# Patient Record
Sex: FEMALE | Race: WHITE | NOT HISPANIC OR LATINO | Employment: OTHER | URBAN - METROPOLITAN AREA
[De-identification: names, ages, dates, MRNs, and addresses within clinical notes are randomized per-mention and may not be internally consistent; named-entity substitution may affect disease eponyms.]

---

## 2019-01-31 ENCOUNTER — PATIENT MESSAGE (OUTPATIENT)
Dept: NEUROLOGY | Facility: CLINIC | Age: 69
End: 2019-01-31

## 2019-01-31 ENCOUNTER — OFFICE VISIT (OUTPATIENT)
Dept: NEUROLOGY | Facility: CLINIC | Age: 69
End: 2019-01-31
Payer: MEDICARE

## 2019-01-31 VITALS
BODY MASS INDEX: 23.8 KG/M2 | HEART RATE: 104 BPM | HEIGHT: 68 IN | SYSTOLIC BLOOD PRESSURE: 129 MMHG | DIASTOLIC BLOOD PRESSURE: 78 MMHG

## 2019-01-31 DIAGNOSIS — G20.C PARKINSONIAN SYNDROME ASSOCIATED WITH IDIOPATHIC ORTHOSTATIC HYPOTENSION: ICD-10-CM

## 2019-01-31 DIAGNOSIS — G20.C PARKINSONISM, UNSPECIFIED PARKINSONISM TYPE: Primary | ICD-10-CM

## 2019-01-31 DIAGNOSIS — G24.9 DYSTONIA: ICD-10-CM

## 2019-01-31 DIAGNOSIS — R44.3 HALLUCINATIONS: ICD-10-CM

## 2019-01-31 DIAGNOSIS — I95.1 PARKINSONIAN SYNDROME ASSOCIATED WITH IDIOPATHIC ORTHOSTATIC HYPOTENSION: ICD-10-CM

## 2019-01-31 DIAGNOSIS — R29.6 MULTIPLE FALLS: ICD-10-CM

## 2019-01-31 DIAGNOSIS — G24.3 CERVICAL DYSTONIA: ICD-10-CM

## 2019-01-31 PROCEDURE — 99999 PR PBB SHADOW E&M-EST. PATIENT-LVL IV: ICD-10-PCS | Mod: PBBFAC,,, | Performed by: NURSE PRACTITIONER

## 2019-01-31 PROCEDURE — 99205 PR OFFICE/OUTPT VISIT, NEW, LEVL V, 60-74 MIN: ICD-10-PCS | Mod: S$PBB,,, | Performed by: NURSE PRACTITIONER

## 2019-01-31 PROCEDURE — 99214 OFFICE O/P EST MOD 30 MIN: CPT | Mod: PBBFAC | Performed by: NURSE PRACTITIONER

## 2019-01-31 PROCEDURE — 99999 PR PBB SHADOW E&M-EST. PATIENT-LVL IV: CPT | Mod: PBBFAC,,, | Performed by: NURSE PRACTITIONER

## 2019-01-31 PROCEDURE — 99205 OFFICE O/P NEW HI 60 MIN: CPT | Mod: S$PBB,,, | Performed by: NURSE PRACTITIONER

## 2019-01-31 RX ORDER — QUETIAPINE FUMARATE 25 MG/1
50 TABLET, FILM COATED ORAL
COMMUNITY
Start: 2015-08-24

## 2019-01-31 RX ORDER — FLUDROCORTISONE ACETATE 0.1 MG/1
100 TABLET ORAL DAILY
COMMUNITY

## 2019-01-31 RX ORDER — PANTOPRAZOLE SODIUM 40 MG/1
TABLET, DELAYED RELEASE ORAL
COMMUNITY
Start: 2019-01-09

## 2019-01-31 RX ORDER — MIDODRINE HYDROCHLORIDE 5 MG/1
TABLET ORAL
COMMUNITY
Start: 2019-01-23

## 2019-01-31 RX ORDER — LACTULOSE 10 G/15ML
SOLUTION ORAL; RECTAL
COMMUNITY
Start: 2018-11-30

## 2019-01-31 RX ORDER — DULOXETIN HYDROCHLORIDE 60 MG/1
CAPSULE, DELAYED RELEASE ORAL
COMMUNITY
Start: 2019-01-23 | End: 2020-01-02

## 2019-01-31 RX ORDER — CARBIDOPA AND LEVODOPA 25; 100 MG/1; MG/1
0.5 TABLET ORAL 2 TIMES DAILY
COMMUNITY

## 2019-01-31 RX ORDER — MULTIVITAMIN
1 TABLET ORAL
COMMUNITY

## 2019-01-31 RX ORDER — DOCUSATE SODIUM 100 MG/1
100 CAPSULE, LIQUID FILLED ORAL
COMMUNITY

## 2019-01-31 NOTE — PATIENT INSTRUCTIONS
Nuplazid can be used for hallucinations. You would need to stop Seroquel for 2 weeks before we could start this.     I would like to know what time of day you take carbidopa / levodopa.     Botox may help your neck.

## 2019-01-31 NOTE — PROGRESS NOTES
"Name: Jolanta Swan  MRN: [id number]   CSN: [id number]      Date: 1-31-19      Referring physician:  Aaarefgeorge Self  No address on file    Subjective:      Chief Complaint: eval for DLB    History of Present Illness (HPI):    Jolanta Swan is a 68 y.o. right-handed female who presents from [place] for an initial evaluation of DLB and is accompanied by son and daughter-in-law.     She has been under the care of Dr. Barrientos, neurologist, in [place] for DLB, diagnosed about 4 years ago. They decided to come to [facility] after they got tired of not getting any options for her treatment. A patient that comes to this clinic recommend she come here.       She has had intermittent falls since her senior care in 2010. Falls have happened with and without dizziness.     Five years ago, she a "massive hallucination event, freaking out, seeing things, could not function and had to go to ED."  She hallucinated a few more times within that year. They think at least a couple of times she may have had a UTI but was not the case with all the hallucinations.   Also in this time frame, she started having problems with orthostatic hypotension.   She started Seroquel 4-5 years ago due to the hallucinations and remains on this. She is interested in Nuplazid.      Four year ago, she was walking with a friend who is a PT. She noted something in her walk and told her she had PD. She then went to see Dr. Barrientos and was diagnosed with DLB.   She recalls having Spring scan and told it was abnormal.     She is taking cd/ld but is not sure if it helps but does not know what it is supposed to do. Son agrees. She has been on this for four years. She thinks it may have helped a little in the very beginning but cannot be sure. She is currently living in [facility]. They administer her meds. She is currently taking cd/ld 25/100 1/2 tab BID (thinks she takes around 8AM and 7PM). It is not clear if she has taken more " "cd/ld in the past.     Three years ago, she passed out due to orthostasis, fell and broke her left hip. While she continues to have OHN, it is less frequent now. Per med list, she is on both fludrocortisone and midodrine. A couple of years ago, she was prescribed Northera but it was going to cost $12K /month. She has not had any recent syncope. She is more likely to feel lightheaded after she eats or uses restroom.        She continues to hallucinate frequently but not daily. She describes as "little tiny ones, little pumpkin faces and people twisting on cables." When she watches TV, some of the actors have pumpkin heads.She thinks could be casued by glasses not fitting properly. She sees dogs on occasion. The hallucinations are very disturbing to her.      She feels her memory is okay but "sometimes better than others." Son notes that she has difficulty finding the right words and her long term is better than short term. If she gets hyperfocused on something, she will not forget it.     She has had a number of falls but not recently as she is in . She has to have assist to walk.     Past year, balance has worsened. Will fall right away if don't walk with her. Getting worse the past 6 months.     For the past 6-9 months, she has had trouble standing up straight and her head tilts to the side, which causes neck pain and stiffness. She has never had Botox or other meds for this.       Recently, she has started to notice a very brief tremor BH. She thinks this is self induced as it is so quick.  Writing has deteoritated.     Aware of mucles stiffness- fairly generalized. Goes to PT 4 times per week, helps to a point.   Feels has to be slow so as not to fall. Aware of cognitive slowness.       Review of Systems   Constitutional: Negative for fatigue.   HENT: Negative for drooling and trouble swallowing.    Eyes: Positive for discharge.   Respiratory: Negative for cough and choking.    Cardiovascular: Negative for leg " "swelling.   Gastrointestinal: Positive for constipation (always been ).   Genitourinary: Positive for urgency.        "not the best control"   Musculoskeletal: Positive for gait problem, neck pain and neck stiffness.   Neurological: Positive for tremors. Negative for dizziness, seizures, syncope and light-headedness.   Psychiatric/Behavioral: Positive for dysphoric mood (off and on all life), hallucinations and sleep disturbance (RBD- alwasy done this- even as child). Negative for agitation and behavioral problems. The patient is nervous/anxious (off and on all life).        Past Medical History: The patient  has a past medical history of Abnormal cervical Pap smear with positive HPV DNA test, Colon polyps, Constipation, Depression with anxiety, Diverticulosis, GERD (gastroesophageal reflux disease), Hyperlipidemia, MALT (mucosa associated lymphoid tissue), Osteoporosis, unspecified, and Rosacea.    Social History: The patient  reports that  has never smoked. she has never used smokeless tobacco. She reports that she drinks about 4.2 - 8.4 oz of alcohol per week. She reports that she does not use drugs.    Family History: Their family history includes Dementia in her father; Heart disease in her father; Hypertension in her father; Lung cancer in her maternal aunt; Parkinsonism in her mother; Stroke in her father.    Allergies: Patient has no known allergies.     Meds:   Current Outpatient Medications on File Prior to Visit   Medication Sig Dispense Refill    carbidopa-levodopa  mg (SINEMET)  mg per tablet Take 0.5 tablets by mouth 2 (two) times daily.      docusate sodium (COLACE) 100 MG capsule Take 100 mg by mouth.      DULoxetine (CYMBALTA) 60 MG capsule       fludrocortisone (FLORINEF) 0.1 mg Tab Take 100 mcg by mouth once daily.      lactulose (CHRONULAC) 10 gram/15 mL solution       levomefolate calcium (DEPLIN) 15 mg Tab Take 15 mg by mouth once daily.      mirtazapine (REMERON) 15 MG " "tablet   0    pantoprazole (PROTONIX) 40 MG tablet       QUEtiapine (SEROQUEL) 25 MG Tab Take 50 mg by mouth.      vitamin D 1000 units Tab Take 185 mg by mouth once daily.      alprazolam (XANAX XR) 1 MG Tb24   0    alprazolam (XANAX) 0.5 MG tablet Take by mouth. 1 Tablet Oral Three times a day.  1 every morning2 every evening      atorvastatin (LIPITOR) 20 MG tablet TAKE ONE TABLET BY MOUTH AT BEDTIME 30 tablet 0    folic acid (FOLVITE) 1 MG tablet   0    midodrine (PROAMATINE) 5 MG Tab       multivitamin (THERAGRAN) per tablet Take 1 tablet by mouth.      [DISCONTINUED] venlafaxine (EFFEXOR-XR) 150 MG Cp24        No current facility-administered medications on file prior to visit.        Objective:     Physical Exam:    Vitals:    01/31/19 1511   BP: 129/78   Pulse: 104   Height: 5' 8" (1.727 m)     Body mass index is 23.8 kg/m².    Constitutional  Well-developed, well-nourished, appears stated age   Cardiovascular  Radial pulses 2+ and symmetric, no LE edema bilaterally     ..  Neurological    * Mental status  MOCA = deferred today   - Orientation Oriented to: person, place, situation, day of week and year  Not oriented to: month of year and floor  When asked president, states she will not utter his name in mixed company. When asked to spell his last name, she is not able to do.      - Memory     Not Tested- provides good hx, relies on son to a degree     - Attention/concentration  Normal     - Language  Somewhat monotone but no real hypophonia or dysarthria     .  * Cranial nerves       - CN II  PERRL, visual fields full to confrontation     - CN III, IV, VI  Extraocular movements full, slowed pursuits and saccades     - CN V  Sensation V1 - V3 intact     - CN VII  Face strong and symmetric bilaterally     - CN VIII  Hearing intact bilaterally     - CN IX, X  Palate raises midline and symmetric     - CN XI  SCM and trapezius 5/5 bilaterally     - CN XII  Tongue midline   * Motor  Muscle essentially " with full power- some trouble with direction when testing muscles on the left side at times, jonas the LLE.   * Coordination  No dysmetria with finger-to-nose but struggles with direction with left finger- will keep putting the LH down and use the RH.    * Gait  See below.         * Specialized movement exam  No hypophonic speech, but somewhat monotone.    Mild facial masking.     Near moderate cogwheel rigidity BUE (L>R) and RLE. Moderate cogwheel LLE.       Mild edy B FT (L>R).  Mild edy R HG and near mod L HG.   Near mod malik R PS.   Mild edy L PS.  Moderate edy B TT (R>L).     Subtle rest tremor BH (low amplitude, mod freq- bit jerky quality).   Subtle tremor present with posture and intention. Has some diff with direction when attempting Left finger to nose.      Head almost to L shoulder with R tilt. Anterocollis.    Elevated L hip (since hip fx 4 years ago).  Elevated Right shoulder.   Truncal dystonia.   Hypertrophied R cervcal paraspinal and trapezius muscles.     No chorea, athetosis, myoclonus, or tics.     Arises out of chair with assist of son.   Step short and narrow based.   Truncal dystonia with elevated L hip and R shoulder.   Near absent arm swing B.   Freezing noted with turns.   Would fall without assist of another.              Laboratory Results:  No visits with results within 3 Month(s) from this visit.   Latest known visit with results is:   Lab Visit on 10/01/2014   Component Date Value Ref Range Status    Sodium 10/01/2014 139  136 - 145 mmol/L Final    Potassium 10/01/2014 4.1  3.5 - 5.1 mmol/L Final    Chloride 10/01/2014 104  95 - 110 mmol/L Final    CO2 10/01/2014 26  23 - 29 mmol/L Final    Glucose 10/01/2014 95  70 - 110 mg/dL Final    BUN, Bld 10/01/2014 10  8 - 23 mg/dL Final    Creatinine 10/01/2014 0.7  0.5 - 1.4 mg/dL Final    Calcium 10/01/2014 9.1  8.7 - 10.5 mg/dL Final    Total Protein 10/01/2014 6.7  6.0 - 8.4 g/dL Final    Albumin 10/01/2014 3.9  3.5 - 5.2  g/dL Final    Total Bilirubin 10/01/2014 0.5  0.1 - 1.0 mg/dL Final    Alkaline Phosphatase 10/01/2014 70  55 - 135 U/L Final    AST 10/01/2014 14  10 - 40 U/L Final    ALT 10/01/2014 15  10 - 44 U/L Final    Anion Gap 10/01/2014 9  8 - 16 mmol/L Final    eGFR if African American 10/01/2014 >60.0  >60 mL/min/1.73 m^2 Final    eGFR if non African American 10/01/2014 >60.0  >60 mL/min/1.73 m^2 Final    WBC 10/01/2014 9.09  3.90 - 12.70 K/uL Final    RBC 10/01/2014 3.93* 4.00 - 5.40 M/uL Final    Hemoglobin 10/01/2014 12.1  12.0 - 16.0 g/dL Final    Hematocrit 10/01/2014 35.5* 37.0 - 48.5 % Final    MCV 10/01/2014 90  82 - 98 fL Final    MCH 10/01/2014 30.8  27.0 - 31.0 pg Final    MCHC 10/01/2014 34.1  32.0 - 36.0 % Final    RDW 10/01/2014 13.1  11.5 - 14.5 % Final    Platelets 10/01/2014 266  150 - 350 K/uL Final    MPV 10/01/2014 9.4  9.2 - 12.9 fL Final    Gran # (ANC) 10/01/2014 7.0  1.8 - 7.7 K/uL Final    Lymph # 10/01/2014 1.5  1.0 - 4.8 K/uL Final    Mono # 10/01/2014 0.4  0.3 - 1.0 K/uL Final    Eos # 10/01/2014 0.1  0.0 - 0.5 K/uL Final    Baso # 10/01/2014 0.03  0.00 - 0.20 K/uL Final    Gran% 10/01/2014 77.3* 38.0 - 73.0 % Final    Lymph% 10/01/2014 16.5* 18.0 - 48.0 % Final    Mono% 10/01/2014 4.8  4.0 - 15.0 % Final    Eosinophil% 10/01/2014 0.9  0.0 - 8.0 % Final    Basophil% 10/01/2014 0.3  0.0 - 1.9 % Final    Differential Method 10/01/2014 Automated   Final       Imaging:   Independent review of images: No outside images or other records brought with them today.     Assessment and Plan     Parkinsonism, unspecified Parkinsonism type  Comments:  Diagnosed 4 years ago as DLB but question if this is correct dx at this point.    Hallucinations  Comments:  Began 5 years ago, continue today and are quite bothersome. Some have been in the setting of UTI but this is not the case wtih all of the hallucinations.     Cervical dystonia  Comments:  Began 6-9 months  ago.    Dystonia  Comments:  Truncal-> began 6-9 months ago    Parkinsonian syndrome associated with idiopathic orthostatic hypotension  Comments:  Orthostatic hypotension began 5 years ago.     Multiple falls  Comments:  Began around 2010, some associated wtih orthostatic hypotension but not all.         Medical Decision Making:    Ms. Swan is certainly parkinsonian and has what sounds like a classic onset of DLB when she developed hallucinations and orthostatic hypotension 5 years ago, followed by a parkinsonian gait 4 years ago. However, in the past 6-9 months, she has developed prominent cervical and truncal dystonia. In addition, her rigidity is much more significant than I have seen in other DLB cases. Rigidity is bilateral but generally more prominent on the left. She also seems to have some Left side neglect when testing left finger to nose and some of the power testing on left side, jonas the LLE, which makes me question CBD, except the rigidity is definitely bilateral.      L-dopa response unclear but sounds like it was never a robust response. At present, she is only taking 25/100 1/2 tab BID (presumably at 8AM and 7PM).  I have asked them to verify the times and message me.     She reportedly has had an abnormal Spring. Not clear if she has had MRI brain or other work-up.   Her mother had some type of parkinsonism.     For now, she will continue her meds without change.   They will let me know about cd/ld dose times.     Answered questions about Nuplazid.     Discussed Botox as option for her neck. She will think about this.     I would like for her to have neuropsychological evaluation as she has never had this. Curious to see what this testing reveals.     I have also asked her to return to see Dr. Dustin Armstrong. They agree.         I spent 70 minutes face-to-face with the patient and family with >50% of the time spent with counseling and education regarding:  - results of data, diagnosis, and  recommendations stated above  - the prognosis of parkinsonism, DLB, hallucinations, dystonia  - risks and benefits of cd/ld, Botox, Nuplazid  - rationale for NP testing.  - importance of diet and exercise    Gale Covarrubias DNP, NP-C  Division of Movement and Memory Disorders  [facility]  [phone]

## 2019-04-15 ENCOUNTER — TELEPHONE (OUTPATIENT)
Dept: NEUROLOGY | Facility: CLINIC | Age: 69
End: 2019-04-15

## 2019-04-15 NOTE — TELEPHONE ENCOUNTER
----- Message from Lukasz Angel sent at 4/15/2019  2:03 PM CDT -----  Needs Advice    Reason for call: James is calling to reschedule missed appt on 03/01/19        Communication Preference: James (son) @ [phone]    Additional Information:

## 2019-04-17 ENCOUNTER — TELEPHONE (OUTPATIENT)
Dept: NEUROLOGY | Facility: CLINIC | Age: 69
End: 2019-04-17

## 2019-04-17 NOTE — TELEPHONE ENCOUNTER
----- Message from Lukasz Angel sent at 4/17/2019  4:47 PM CDT -----  Patient Returning Call from [facility]    Who Left Message for Patient: Denisse  Communication Preference: James (son) @ [phone]  Additional Information:

## 2019-04-23 ENCOUNTER — TELEPHONE (OUTPATIENT)
Dept: NEUROLOGY | Facility: CLINIC | Age: 69
End: 2019-04-23

## 2019-04-23 NOTE — TELEPHONE ENCOUNTER
----- Message from Kati Castañeda sent at 4/23/2019  3:39 PM CDT -----  Patient Returning Call from [facility]    Who Left Message for Patient:Dneisse   Communication Preference:Vitor () @ [phone]  Additional Information:says he has made several attempts to reach someone. Please call.

## 2019-06-11 ENCOUNTER — OFFICE VISIT (OUTPATIENT)
Dept: NEUROLOGY | Facility: CLINIC | Age: 69
End: 2019-06-11
Payer: MEDICARE

## 2019-06-11 VITALS
BODY MASS INDEX: 19.58 KG/M2 | HEIGHT: 68 IN | SYSTOLIC BLOOD PRESSURE: 140 MMHG | WEIGHT: 129.19 LBS | HEART RATE: 100 BPM | DIASTOLIC BLOOD PRESSURE: 88 MMHG

## 2019-06-11 DIAGNOSIS — G20.A1 PARKINSON DISEASE: Primary | ICD-10-CM

## 2019-06-11 PROCEDURE — 99999 PR PBB SHADOW E&M-EST. PATIENT-LVL III: CPT | Mod: PBBFAC,,, | Performed by: PSYCHIATRY & NEUROLOGY

## 2019-06-11 PROCEDURE — 99213 OFFICE O/P EST LOW 20 MIN: CPT | Mod: PBBFAC | Performed by: PSYCHIATRY & NEUROLOGY

## 2019-06-11 PROCEDURE — 99999 PR PBB SHADOW E&M-EST. PATIENT-LVL III: ICD-10-PCS | Mod: PBBFAC,,, | Performed by: PSYCHIATRY & NEUROLOGY

## 2019-06-11 RX ORDER — DONEPEZIL HYDROCHLORIDE 10 MG/1
10 TABLET, FILM COATED ORAL NIGHTLY
Qty: 30 TABLET | Refills: 11 | Status: SHIPPED | OUTPATIENT
Start: 2019-06-11

## 2019-06-11 NOTE — PROGRESS NOTES
Name: Jolanta Swan  MRN: [id number]   CSN: [id number]      Date: 06/11/2019    Patient seen and examined.  I agree with the history, exam, assessment and plan within the resident's note.            Dustin Armstrong MD, MPH  Division of Movement and Memory Disorders  [facility]

## 2019-06-13 ENCOUNTER — TELEPHONE (OUTPATIENT)
Dept: NEUROLOGY | Facility: CLINIC | Age: 69
End: 2019-06-13

## 2019-06-13 NOTE — TELEPHONE ENCOUNTER
----- Message from Lacey Pedroza sent at 6/13/2019 12:45 PM CDT -----  Contact: PT son   Pt son calling in to schedule PT 6 week follow up .    Vitor Sosa can be reached at  [phone] thanks

## 2019-07-20 ENCOUNTER — PATIENT MESSAGE (OUTPATIENT)
Dept: NEUROLOGY | Facility: CLINIC | Age: 69
End: 2019-07-20

## 2019-07-22 ENCOUNTER — TELEPHONE (OUTPATIENT)
Dept: NEUROLOGY | Facility: CLINIC | Age: 69
End: 2019-07-22

## 2019-07-22 ENCOUNTER — PATIENT MESSAGE (OUTPATIENT)
Dept: NEUROLOGY | Facility: CLINIC | Age: 69
End: 2019-07-22

## 2019-07-22 NOTE — TELEPHONE ENCOUNTER
----- Message from Raymundo Elizabeth sent at 7/19/2019  5:25 PM CDT -----  Contact: Pt's Son Sosa  Pt would like to be called back regarding needing to be seen within five weeks after visit.    Pt can be reached at [phone].    Thank You.

## 2019-07-24 ENCOUNTER — TELEPHONE (OUTPATIENT)
Dept: NEUROLOGY | Facility: CLINIC | Age: 69
End: 2019-07-24

## 2019-07-24 NOTE — TELEPHONE ENCOUNTER
----- Message from Carolyn Lomax sent at 7/24/2019  7:48 AM CDT -----  Contact: [employer] request  Message     Appointment Request From: Jolanta Swan    With Provider: Dustin Armstrong MD [[facility] - Neurology]    Preferred Date Range: 7/23/2019 - 7/24/2019    Preferred Times: Any time    Reason for visit: Existing Patient    Comments:  The last visit was 6/11. We were requested to come back in 5 weeks. I thought there was an appointment scheduled automatically.

## 2019-07-26 ENCOUNTER — TELEPHONE (OUTPATIENT)
Dept: NEUROLOGY | Facility: CLINIC | Age: 69
End: 2019-07-26

## 2019-07-26 NOTE — TELEPHONE ENCOUNTER
----- Message from Yumiko Fierro sent at 7/26/2019  4:39 PM CDT -----  Contact: Vitor (son) @ [phone]  Pts son says he is returning Denisse's call.

## 2019-07-31 DIAGNOSIS — G24.3 CERVICAL DYSTONIA: Primary | ICD-10-CM

## 2019-07-31 NOTE — TELEPHONE ENCOUNTER
Call placed to patient son Vitor to schedule Botox appointment. Left message for return call to office.

## 2019-08-01 ENCOUNTER — TELEPHONE (OUTPATIENT)
Dept: NEUROLOGY | Facility: CLINIC | Age: 69
End: 2019-08-01

## 2019-08-01 NOTE — TELEPHONE ENCOUNTER
----- Message from Kati Castañeda sent at 8/1/2019  9:40 AM CDT -----  Contact: Vitor (son) @ [phone]  Patient Returning Call from [facility]    Who Left Message for Patient:Denisse  Communication Preference:Vitor (son) @ [phone]  Additional Information:

## 2019-08-01 NOTE — TELEPHONE ENCOUNTER
----- Message from Kati Castañeda sent at 8/1/2019  2:30 PM CDT -----  Contact: Vitor (son) @ [phone]  Calling to speak with someone in Dr. Armstrong's office regarding the patient's condition. Please call.

## 2019-08-03 ENCOUNTER — PATIENT MESSAGE (OUTPATIENT)
Dept: NEUROLOGY | Facility: CLINIC | Age: 69
End: 2019-08-03

## 2019-08-09 ENCOUNTER — TELEPHONE (OUTPATIENT)
Dept: NEUROLOGY | Facility: CLINIC | Age: 69
End: 2019-08-09

## 2019-08-09 NOTE — TELEPHONE ENCOUNTER
----- Message from Yumiko Fierro sent at 8/9/2019  2:52 PM CDT -----  Contact: Vitor (son) @ [phone]  Pts son says he is still waiting on a returning call concerning a f/u appt and Botox appt.  Pt was advised to return 5 weeks from 6-11-19 but she has not received an appt yet.  Pls call.

## 2019-08-10 ENCOUNTER — PATIENT MESSAGE (OUTPATIENT)
Dept: NEUROLOGY | Facility: CLINIC | Age: 69
End: 2019-08-10

## 2019-08-15 ENCOUNTER — PATIENT MESSAGE (OUTPATIENT)
Dept: NEUROLOGY | Facility: CLINIC | Age: 69
End: 2019-08-15

## 2019-08-16 ENCOUNTER — PATIENT MESSAGE (OUTPATIENT)
Dept: NEUROLOGY | Facility: CLINIC | Age: 69
End: 2019-08-16

## 2019-08-22 ENCOUNTER — PROCEDURE VISIT (OUTPATIENT)
Dept: NEUROLOGY | Facility: CLINIC | Age: 69
End: 2019-08-22
Payer: MEDICARE

## 2019-08-22 VITALS
DIASTOLIC BLOOD PRESSURE: 81 MMHG | HEART RATE: 99 BPM | BODY MASS INDEX: 19.34 KG/M2 | HEIGHT: 68 IN | SYSTOLIC BLOOD PRESSURE: 129 MMHG | WEIGHT: 127.63 LBS

## 2019-08-22 DIAGNOSIS — G20.A1 PARKINSON DISEASE: Primary | ICD-10-CM

## 2019-08-22 PROCEDURE — 64616 CHEMODENERV MUSC NECK DYSTON: CPT | Mod: PBBFAC

## 2019-08-23 RX ADMIN — ONABOTULINUMTOXINA 300 UNITS: 100 INJECTION, POWDER, LYOPHILIZED, FOR SOLUTION INTRADERMAL; INTRAMUSCULAR at 11:08

## 2019-08-28 ENCOUNTER — PATIENT MESSAGE (OUTPATIENT)
Dept: NEUROLOGY | Facility: CLINIC | Age: 69
End: 2019-08-28

## 2019-09-02 ENCOUNTER — PATIENT MESSAGE (OUTPATIENT)
Dept: NEUROLOGY | Facility: CLINIC | Age: 69
End: 2019-09-02

## 2019-09-04 NOTE — PROCEDURES
Procedures - ful note to be scanned in.  dj        Dustin Armstrong MD, MPH  Division of Movement and Memory Disorders  [facility]

## 2019-09-09 ENCOUNTER — TELEPHONE (OUTPATIENT)
Dept: NEUROLOGY | Facility: CLINIC | Age: 69
End: 2019-09-09

## 2019-09-09 NOTE — TELEPHONE ENCOUNTER
----- Message from Yumiko Fierro sent at 9/6/2019  1:11 PM CDT -----  Contact: [facility]    [phone]  Pt is having problems with her prescription for carbidopa-levodopa  mg (SINEMET)  mg per tablet it was adjusted from 2X a day to 3X a day and she has been having more tremors since the adjustment.  Pls call to discuss asap.

## 2019-09-11 NOTE — TELEPHONE ENCOUNTER
ret'd nurse's call. Patient is having increased tremors now with taking C/L 1 tab PO TID. She was taking 1/2 tab PO BID prior to this.     Would it be okay to decrease her dose some?    Requests call back with any new orders:  [phone] [place].

## 2019-09-23 ENCOUNTER — TELEPHONE (OUTPATIENT)
Dept: NEUROLOGY | Facility: CLINIC | Age: 69
End: 2019-09-23

## 2019-09-23 NOTE — TELEPHONE ENCOUNTER
Called and spoke with nurse relaying provider message to stop taking donepezil     Dustin Armstrong MD   to Jolanta Swan            8:30 AM   Stop Aricept now as a trial, and see how she does

## 2019-09-23 NOTE — TELEPHONE ENCOUNTER
----- Message from Terry Santos sent at 9/21/2019  1:40 PM CDT -----  Contact: Nurse [facility]  Teresea is calling to speak to a nurse regarding a medication her son's instructed the patient to stop taking     [phone]

## 2019-09-25 ENCOUNTER — TELEPHONE (OUTPATIENT)
Dept: NEUROLOGY | Facility: CLINIC | Age: 69
End: 2019-09-25

## 2019-09-25 NOTE — TELEPHONE ENCOUNTER
----- Message from Fern Fuller sent at 9/25/2019  1:32 PM CDT -----  Contact: [facility]  MD Dustin Sotomayor MD  Outpatient Medication Detail      Disp Refills Start End   donepezil (ARICEPT) 10 MG tablet 30 tablet 11 6/11/2019    Sig - Route: Take 1 tablet (10 mg total) by mouth every evening. Start with 1/2 tab for the first month. - Oral   Class: Print   Pharmacy     [id number] [place] - [address] [facility]    Please contact [facility] about this rx- [phone]- Mahnaz

## 2019-11-11 DIAGNOSIS — G24.3 CERVICAL DYSTONIA: Primary | ICD-10-CM

## 2019-11-13 ENCOUNTER — PROCEDURE VISIT (OUTPATIENT)
Dept: NEUROLOGY | Facility: CLINIC | Age: 69
End: 2019-11-13
Payer: MEDICARE

## 2019-11-13 VITALS — SYSTOLIC BLOOD PRESSURE: 138 MMHG | DIASTOLIC BLOOD PRESSURE: 83 MMHG | HEART RATE: 101 BPM

## 2019-11-13 DIAGNOSIS — G20.C PARKINSONISM, UNSPECIFIED PARKINSONISM TYPE: Primary | ICD-10-CM

## 2019-11-15 ENCOUNTER — TELEPHONE (OUTPATIENT)
Dept: NEUROLOGY | Facility: CLINIC | Age: 69
End: 2019-11-15

## 2019-11-15 NOTE — TELEPHONE ENCOUNTER
----- Message from Neelima Retana sent at 11/15/2019  1:29 PM CST -----  Contact: pt  Pt was seen yesterday and [facility] wants to know of any special orders they need to know about      [facility] [phone]

## 2019-12-09 ENCOUNTER — PATIENT MESSAGE (OUTPATIENT)
Dept: NEUROLOGY | Facility: CLINIC | Age: 69
End: 2019-12-09

## 2019-12-18 ENCOUNTER — OFFICE VISIT (OUTPATIENT)
Dept: NEUROLOGY | Facility: CLINIC | Age: 69
End: 2019-12-18
Payer: MEDICARE

## 2019-12-18 DIAGNOSIS — G24.3 CERVICAL DYSTONIA: Primary | ICD-10-CM

## 2019-12-18 DIAGNOSIS — G20.C PARKINSONISM, UNSPECIFIED PARKINSONISM TYPE: ICD-10-CM

## 2019-12-18 PROCEDURE — 99499 UNLISTED E&M SERVICE: CPT | Mod: S$PBB,95,, | Performed by: PSYCHIATRY & NEUROLOGY

## 2019-12-18 PROCEDURE — 99999 PR PBB SHADOW E&M-EST. PATIENT-LVL I: CPT | Mod: PBBFAC,95,, | Performed by: PSYCHIATRY & NEUROLOGY

## 2019-12-18 PROCEDURE — 99499 NO LOS: ICD-10-PCS | Mod: S$PBB,95,, | Performed by: PSYCHIATRY & NEUROLOGY

## 2019-12-18 PROCEDURE — 99211 OFF/OP EST MAY X REQ PHY/QHP: CPT | Mod: PBBFAC | Performed by: PSYCHIATRY & NEUROLOGY

## 2019-12-18 PROCEDURE — 99999 PR PBB SHADOW E&M-EST. PATIENT-LVL I: ICD-10-PCS | Mod: PBBFAC,95,, | Performed by: PSYCHIATRY & NEUROLOGY

## 2019-12-18 NOTE — PROGRESS NOTES
Attempted to connect for VIrtual Visit.  Some technical difficulty and the patient disconnected.  Tried to call all three numbers in the chart and there was no answer.  Will try to reschedule asap.  nevin

## 2019-12-23 ENCOUNTER — PATIENT MESSAGE (OUTPATIENT)
Dept: NEUROLOGY | Facility: CLINIC | Age: 69
End: 2019-12-23

## 2020-01-02 ENCOUNTER — OFFICE VISIT (OUTPATIENT)
Dept: NEUROLOGY | Facility: CLINIC | Age: 70
End: 2020-01-02
Payer: MEDICARE

## 2020-01-02 DIAGNOSIS — G24.3 CERVICAL DYSTONIA: Primary | ICD-10-CM

## 2020-01-02 PROCEDURE — 1159F PR MEDICATION LIST DOCUMENTED IN MEDICAL RECORD: ICD-10-PCS | Mod: 95,,, | Performed by: PSYCHIATRY & NEUROLOGY

## 2020-01-02 PROCEDURE — 99213 OFFICE O/P EST LOW 20 MIN: CPT | Mod: S$PBB,95,, | Performed by: PSYCHIATRY & NEUROLOGY

## 2020-01-02 PROCEDURE — 99999 PR PBB SHADOW E&M-EST. PATIENT-LVL I: CPT | Mod: PBBFAC,95,, | Performed by: PSYCHIATRY & NEUROLOGY

## 2020-01-02 PROCEDURE — 99213 PR OFFICE/OUTPT VISIT, EST, LEVL III, 20-29 MIN: ICD-10-PCS | Mod: S$PBB,95,, | Performed by: PSYCHIATRY & NEUROLOGY

## 2020-01-02 PROCEDURE — 99999 PR PBB SHADOW E&M-EST. PATIENT-LVL I: ICD-10-PCS | Mod: PBBFAC,95,, | Performed by: PSYCHIATRY & NEUROLOGY

## 2020-01-02 PROCEDURE — 1159F MED LIST DOCD IN RCRD: CPT | Mod: 95,,, | Performed by: PSYCHIATRY & NEUROLOGY

## 2020-01-02 PROCEDURE — 99211 OFF/OP EST MAY X REQ PHY/QHP: CPT | Mod: PBBFAC | Performed by: PSYCHIATRY & NEUROLOGY

## 2020-01-02 RX ORDER — SERTRALINE HYDROCHLORIDE 25 MG/1
25 TABLET, FILM COATED ORAL DAILY
Qty: 30 TABLET | Refills: 11 | Status: SHIPPED | OUTPATIENT
Start: 2020-01-02 | End: 2021-01-01

## 2020-01-06 ENCOUNTER — TELEPHONE (OUTPATIENT)
Dept: NEUROLOGY | Facility: CLINIC | Age: 70
End: 2020-01-06

## 2020-01-06 NOTE — TELEPHONE ENCOUNTER
----- Message from Clemencia Cuello sent at 1/6/2020 10:58 AM CST -----  Contact: shivani from [facility]  Called to verify patients' medicine change.     They can be reached at [phone]    Thanks  KB

## 2020-01-27 ENCOUNTER — TELEPHONE (OUTPATIENT)
Dept: NEUROLOGY | Facility: CLINIC | Age: 70
End: 2020-01-27

## 2020-02-06 DIAGNOSIS — G24.3 CERVICAL DYSTONIA: Primary | ICD-10-CM

## 2020-02-12 ENCOUNTER — PATIENT MESSAGE (OUTPATIENT)
Dept: NEUROLOGY | Facility: CLINIC | Age: 70
End: 2020-02-12

## 2020-02-16 ENCOUNTER — PATIENT MESSAGE (OUTPATIENT)
Dept: NEUROLOGY | Facility: CLINIC | Age: 70
End: 2020-02-16

## 2020-03-01 ENCOUNTER — PATIENT MESSAGE (OUTPATIENT)
Dept: NEUROLOGY | Facility: CLINIC | Age: 70
End: 2020-03-01

## 2021-09-04 ENCOUNTER — PATIENT MESSAGE (OUTPATIENT)
Dept: NEUROLOGY | Facility: CLINIC | Age: 71
End: 2021-09-04

## 2021-09-12 ENCOUNTER — PATIENT MESSAGE (OUTPATIENT)
Dept: NEUROLOGY | Facility: CLINIC | Age: 71
End: 2021-09-12

## 2022-05-05 ENCOUNTER — PATIENT MESSAGE (OUTPATIENT)
Dept: RESEARCH | Facility: HOSPITAL | Age: 72
End: 2022-05-05
Payer: MEDICARE

## 2024-03-30 NOTE — TELEPHONE ENCOUNTER
----- Message from Ender Gutierrez sent at 1/27/2020  9:50 AM CST -----  Contact: Florencio with [facility] on [address]      The caller states that the Pt was prescribed -  sertraline (ZOLOFT) 25 MG tablet and that it was sent to the wrong pharmacy.  The caller states that they didn't receive the last progress notes from the conference call to know that that Rx was prescribed for the Pt.  The caller states that it should've went to [facility] - [address] [place], [place] [address] [id number]    Pharmacy Fax # [phone]  Pharmacy Ph # [phone]    Caller Phone # [phone] (ask for the Hibiscus section for Florencio).  Fax # for the Progress notes from the phone call should go to Fax # [phone]  
Placed f/u call to [place]. Sent office note to her fax.  Phoned in Sertraline to [facility].  
non-verbal indicators present